# Patient Record
Sex: FEMALE | Race: WHITE
[De-identification: names, ages, dates, MRNs, and addresses within clinical notes are randomized per-mention and may not be internally consistent; named-entity substitution may affect disease eponyms.]

---

## 2018-03-29 ENCOUNTER — HOSPITAL ENCOUNTER (OUTPATIENT)
Dept: HOSPITAL 89 - US | Age: 53
End: 2018-03-29
Attending: PHYSICIAN ASSISTANT
Payer: COMMERCIAL

## 2018-03-29 DIAGNOSIS — K46.9: Primary | ICD-10-CM

## 2018-03-29 PROCEDURE — 76705 ECHO EXAM OF ABDOMEN: CPT

## 2018-03-29 NOTE — RADIOLOGY IMAGING REPORT
FACILITY: [facility] 

 

PATIENT NAME: Laci Morfin

: 1965

MR: [id number]

V: [id number]

EXAM DATE: 

ORDERING PHYSICIAN: ROYAL BOOTH

TECHNOLOGIST: 

 

[place] [facility] [place]

Patient: Laci Morfin

: 1965

MRN: [id number]

Visit/Account:[id number]

Date of Sevice:  3/29/2018

 

ACCESSION #: [id number]

 

US SINGLE ORGAN, right lower quadrant

 

HISTORY:  Felt lump in right lower quadrant and right mid abdomen

 

Multiple sonographic images were submitted of the right lower quadrant and right periumbilical region
 both with and without a Valsalva maneuver.  There is no demonstration of an abdominal wall hernia in
 this location.  No evidence of a subcutaneous mass or abnormal collection.

 

IMPRESSION:

No demonstration of an abdominal wall hernia, abnormal mass or collection in the right periumbilical 
anterior abdominal wall and right lower quadrant

 

Report Dictated By: Milena Gillette MD at 3/29/2018 3:20 PM

 

Report E-Signed By: Milena Gillette MD  at 3/29/2018 3:23 PM

 

WSN:[id number]

## 2018-04-25 ENCOUNTER — HOSPITAL ENCOUNTER (OUTPATIENT)
Dept: HOSPITAL 89 - CT | Age: 53
End: 2018-04-25
Attending: PHYSICIAN ASSISTANT
Payer: COMMERCIAL

## 2018-04-25 DIAGNOSIS — K42.9: ICD-10-CM

## 2018-04-25 DIAGNOSIS — K40.90: Primary | ICD-10-CM

## 2018-04-25 PROCEDURE — 74178 CT ABD&PLV WO CNTR FLWD CNTR: CPT

## 2018-04-25 NOTE — RADIOLOGY IMAGING REPORT
FACILITY: [facility] 

 

PATIENT NAME: Laci Morfin

: 1965

MR: [id number]

V: [id number]

EXAM DATE: 

ORDERING PHYSICIAN: ROYAL BOOTH

TECHNOLOGIST: 

 

Location: [facility]

Patient: Laci Morfin

: 1965

MRN: [id number]

Visit/Account:[id number]

Date of Sevice:  2018

 

ACCESSION #: [id number]

 

ABDOMEN/PELVIS W/WO CONTRAST

 

HISTORY:  Abdominal pain, right lower quadrant pain after heavy lifting, patient heard a "pop"'s in h
er abdomen

 

TECHNIQUE: Axial images acquired through the abdomen/pelvis both with and without IV contrast..  Srinivasa
nal and sagittal reformatting also performed. Dose Lowering Technique

 

One of the following dose optimization techniques was utilized in the performance of this exam: Autom
ated exposure control; adjustment of the mA and/or kV according to the patient's size; or use of an i
terative  reconstruction technique.  Specific details can be referenced in the facility's radiology C
T exam operational policy.

 

 

 

CONTRAST:  75 mL Isovue-370

 

COMPARISON:  Ultrasound 2018

 

FINDINGS:

 

Visualized lung bases:  Negative.

 

Hepatobiliary:  Negative.

 

Spleen:  Negative.

 

Adrenals:  Negative.

 

Pancreas:  Negative.

 

Kidneys ureters and bladder: Negative.

 

Genitalia:  Mildly heterogeneous myometrium

 

GI:  No evidence of bowel obstruction bowel wall thickening.  The appendix is visualized and does not
 appear inflamed.

 

Vessels/spaces/nodes:  Minimal vascular calcifications

 

Bones/soft tissues:  There is a tiny umbilical hernia containing fat

 

Additional findings:  There are tiny bilateral inguinal hernias containing fat, left greater than rig
ht.  No evidence of a ventral hernia.  There is thinning of the rectus sheath in the midline just bel
ow the level the umbilicus

 

There are moderate spondylotic changes L5-S1

 

IMPRESSION:

 

There are tiny bilateral inguinal hernias containing fat, left greater than right

 

Tiny umbilical hernia containing fat

 

Thinning of the rectus sheath in the midline just below the level the umbilicus

 

Mildly heterogeneous myometrium.  If of concern pelvic ultrasound may be helpful

 

Report Dictated By: Milena Gillette MD at 2018 1:46 PM

 

Report E-Signed By: Milena Gillette MD  at 2018 1:56 PM

 

WSN:[email or web address]